# Patient Record
Sex: FEMALE | Race: BLACK OR AFRICAN AMERICAN
[De-identification: names, ages, dates, MRNs, and addresses within clinical notes are randomized per-mention and may not be internally consistent; named-entity substitution may affect disease eponyms.]

---

## 2018-08-14 LAB
APPEARANCE UR: CLEAR
APTT PPP: YELLOW S
BILIRUB UR QL STRIP: NEGATIVE
ERYTHROCYTE [DISTWIDTH] IN BLOOD BY AUTOMATED COUNT: 17.8 % (ref 11.5–14)
GLUCOSE UR STRIP-MCNC: NEGATIVE MG/DL
HCT VFR BLD CALC: 31.7 % (ref 36–47)
HGB BLD-MCNC: 10.2 G/DL (ref 12–15.5)
KETONES UR STRIP-MCNC: NEGATIVE MG/DL
MCH RBC QN AUTO: 20.3 PG (ref 27–33.4)
MCHC RBC AUTO-ENTMCNC: 32.3 G/DL (ref 32–36)
MCV RBC AUTO: 63 FL (ref 80–97)
NITRITE UR QL STRIP: NEGATIVE
PH UR STRIP: 6 [PH] (ref 5–9)
PLATELET # BLD: 238 10^3/UL (ref 150–450)
PROT UR STRIP-MCNC: NEGATIVE MG/DL
RBC # BLD AUTO: 5.03 10^6/UL (ref 3.72–5.28)
SP GR UR STRIP: 1.02
UROBILINOGEN UR-MCNC: NEGATIVE MG/DL (ref ?–2)
WBC # BLD AUTO: 4 10^3/UL (ref 4–10.5)

## 2018-08-15 LAB — PATH REV BLD -IMP: (no result)

## 2018-09-25 ENCOUNTER — HOSPITAL ENCOUNTER (OUTPATIENT)
Dept: HOSPITAL 62 - OROUT | Age: 58
Discharge: HOME | End: 2018-09-25
Attending: OBSTETRICS & GYNECOLOGY
Payer: OTHER GOVERNMENT

## 2018-09-25 VITALS — SYSTOLIC BLOOD PRESSURE: 141 MMHG | DIASTOLIC BLOOD PRESSURE: 87 MMHG

## 2018-09-25 DIAGNOSIS — N95.0: ICD-10-CM

## 2018-09-25 DIAGNOSIS — N84.0: Primary | ICD-10-CM

## 2018-09-25 DIAGNOSIS — D64.9: ICD-10-CM

## 2018-09-25 DIAGNOSIS — Z79.899: ICD-10-CM

## 2018-09-25 DIAGNOSIS — Z79.82: ICD-10-CM

## 2018-09-25 DIAGNOSIS — E03.9: ICD-10-CM

## 2018-09-25 DIAGNOSIS — I10: ICD-10-CM

## 2018-09-25 DIAGNOSIS — Z01.818: ICD-10-CM

## 2018-09-25 PROCEDURE — 93010 ELECTROCARDIOGRAM REPORT: CPT

## 2018-09-25 PROCEDURE — 85027 COMPLETE CBC AUTOMATED: CPT

## 2018-09-25 PROCEDURE — 81005 URINALYSIS: CPT

## 2018-09-25 PROCEDURE — 88305 TISSUE EXAM BY PATHOLOGIST: CPT

## 2018-09-25 PROCEDURE — 58558 HYSTEROSCOPY BIOPSY: CPT

## 2018-09-25 PROCEDURE — 93005 ELECTROCARDIOGRAM TRACING: CPT

## 2018-09-25 PROCEDURE — S0028 INJECTION, FAMOTIDINE, 20 MG: HCPCS

## 2018-09-25 PROCEDURE — 36415 COLL VENOUS BLD VENIPUNCTURE: CPT

## 2018-09-25 NOTE — EKG REPORT
SEVERITY:- OTHERWISE NORMAL ECG -

SINUS RHYTHM

BORDERLINE LEFT AXIS DEVIATION

:

Confirmed by: Allison Saldana MD 25-Sep-2018 17:00:09

## 2018-09-25 NOTE — OPERATIVE REPORT E
Operative Report



NAME: SYDNEE TODD

MRN:  [id number]          : 1960 AGE:  58Y

DATE OF SURGERY: 2018              ROOM:



PREOPERATIVE DIAGNOSIS:

POSTMENOPAUSAL BLEEDING.



POSTOPERATIVE DIAGNOSIS:

1.   POSTMENOPAUSAL BLEEDING.

2.   UTERINE POLYPS.



OPERATION:

Diagnostic hysteroscopy with polypectomy and D and C.



SURGEON:

LUIS MIGUEL WONG M.D.



ANESTHESIA:

General.



ESTIMATED BLOOD LOSS:

Less than 10 mL.



TISSUE REMOVED OR ALTERED:

Polyp and endometrial tissue.



PROCEDURE:

The patient was placed in dorsal lithotomy position, prepped, and draped

in the usual sterile fashion.  Speculum placed and cervix visualized and

grasped with a single-tooth tenaculum.  The cervix was stenotic and it

took lacrimal duct dilators to gradually dilate the cervix to accept the

hysteroscope.  Hysteroscopy performed with a large polyp being noted. 

MyoSure was then used to remove the polyp.  Following the MyoSure

polypectomy, sharp curettage was performed.  Moderate amount of tissue was

recovered.  The single-tooth tenaculum then removed and procedure

terminated.  She was taken to the Recovery Room in good condition.





DICTATING PHYSICIAN:  LUIS MIGUEL WONG M.D.





[id number]                  DT: 2018    1142

PHY#: [id number]            DD: 2018    1110

ID:   [id number]           JOB#: [id number]       ACCT: [id number]



cc:LUIS MIGUEL WONG M.D.

>